# Patient Record
Sex: MALE | Race: WHITE | NOT HISPANIC OR LATINO | Employment: OTHER | URBAN - METROPOLITAN AREA
[De-identification: names, ages, dates, MRNs, and addresses within clinical notes are randomized per-mention and may not be internally consistent; named-entity substitution may affect disease eponyms.]

---

## 2018-05-09 ENCOUNTER — OUTPATIENT INFUSION SERVICES (OUTPATIENT)
Dept: ONCOLOGY | Facility: MEDICAL CENTER | Age: 63
End: 2018-05-09
Attending: ALLERGY & IMMUNOLOGY
Payer: MEDICARE

## 2018-05-09 VITALS
BODY MASS INDEX: 44.51 KG/M2 | SYSTOLIC BLOOD PRESSURE: 147 MMHG | TEMPERATURE: 98.6 F | DIASTOLIC BLOOD PRESSURE: 67 MMHG | OXYGEN SATURATION: 93 % | WEIGHT: 293.65 LBS | HEART RATE: 67 BPM | HEIGHT: 68 IN | RESPIRATION RATE: 18 BRPM

## 2018-05-09 PROCEDURE — 700111 HCHG RX REV CODE 636 W/ 250 OVERRIDE (IP): Mod: TB | Performed by: ALLERGY & IMMUNOLOGY

## 2018-05-09 PROCEDURE — 96401 CHEMO ANTI-NEOPL SQ/IM: CPT

## 2018-05-09 RX ORDER — LEVOTHYROXINE SODIUM 0.15 MG/1
150 TABLET ORAL
COMMUNITY

## 2018-05-09 RX ORDER — CHLORAL HYDRATE 500 MG
1000 CAPSULE ORAL
COMMUNITY

## 2018-05-09 RX ORDER — METOPROLOL SUCCINATE 100 MG/1
100 TABLET, EXTENDED RELEASE ORAL DAILY
COMMUNITY
End: 2020-04-21

## 2018-05-09 RX ORDER — COVID-19 ANTIGEN TEST
KIT MISCELLANEOUS
COMMUNITY
End: 2021-06-30

## 2018-05-09 RX ORDER — BUDESONIDE AND FORMOTEROL FUMARATE DIHYDRATE 160; 4.5 UG/1; UG/1
2 AEROSOL RESPIRATORY (INHALATION) 2 TIMES DAILY
COMMUNITY
End: 2020-03-23

## 2018-05-09 RX ORDER — ATORVASTATIN CALCIUM 10 MG/1
10 TABLET, FILM COATED ORAL NIGHTLY
COMMUNITY
End: 2020-04-21

## 2018-05-09 RX ADMIN — MEPOLIZUMAB 100 MG: 100 INJECTION, POWDER, FOR SOLUTION SUBCUTANEOUS at 08:41

## 2018-05-09 ASSESSMENT — PAIN SCALES - GENERAL: PAINLEVEL_OUTOF10: 0

## 2018-05-09 NOTE — PROGRESS NOTES
Pt arrived ambulatory to IS for first Nucala injection.  Educated patient on new drug and s/sx of reaction.  Pt verbalized understanding of teaching.  Pt denies recent signs of infection.  Nucala administered subq to back of L arm, injection site covered with adhesive bandage.  Pt monitored for 20 minutes after injection given it is his first time.  No adverse reaction noted.  Next appointment confirmed.  Pt discharged from IS in NAD under care of spouse.

## 2018-06-06 ENCOUNTER — OUTPATIENT INFUSION SERVICES (OUTPATIENT)
Dept: ONCOLOGY | Facility: MEDICAL CENTER | Age: 63
End: 2018-06-06
Attending: ALLERGY & IMMUNOLOGY
Payer: MEDICARE

## 2018-06-06 VITALS
RESPIRATION RATE: 18 BRPM | BODY MASS INDEX: 46.06 KG/M2 | WEIGHT: 293.43 LBS | HEART RATE: 66 BPM | SYSTOLIC BLOOD PRESSURE: 148 MMHG | DIASTOLIC BLOOD PRESSURE: 48 MMHG | OXYGEN SATURATION: 92 % | TEMPERATURE: 97.4 F | HEIGHT: 67 IN

## 2018-06-06 PROCEDURE — 96401 CHEMO ANTI-NEOPL SQ/IM: CPT

## 2018-06-06 PROCEDURE — 700111 HCHG RX REV CODE 636 W/ 250 OVERRIDE (IP): Mod: TB | Performed by: ALLERGY & IMMUNOLOGY

## 2018-06-06 RX ADMIN — MEPOLIZUMAB 100 MG: 100 INJECTION, POWDER, FOR SOLUTION SUBCUTANEOUS at 08:45

## 2018-06-06 ASSESSMENT — PAIN SCALES - GENERAL: PAINLEVEL_OUTOF10: 0

## 2018-06-06 NOTE — PROGRESS NOTES
Pt arrived ambulatory to IS for first Nucala injection.  Pt denies recent signs of infection.  Nucala administered subq to back of L arm, injection site covered with adhesive bandage.  Pt tolerated well.  Next appointment confirmed.  Pt discharged from IS in NAD under self care.  e

## 2018-07-04 ENCOUNTER — OUTPATIENT INFUSION SERVICES (OUTPATIENT)
Dept: ONCOLOGY | Facility: MEDICAL CENTER | Age: 63
End: 2018-07-04
Attending: ALLERGY & IMMUNOLOGY
Payer: MEDICARE

## 2018-07-04 VITALS
OXYGEN SATURATION: 92 % | HEART RATE: 67 BPM | HEIGHT: 67 IN | RESPIRATION RATE: 18 BRPM | DIASTOLIC BLOOD PRESSURE: 50 MMHG | BODY MASS INDEX: 46.06 KG/M2 | WEIGHT: 293.43 LBS | SYSTOLIC BLOOD PRESSURE: 147 MMHG | TEMPERATURE: 98 F

## 2018-07-04 PROCEDURE — 700111 HCHG RX REV CODE 636 W/ 250 OVERRIDE (IP): Mod: TB | Performed by: ALLERGY & IMMUNOLOGY

## 2018-07-04 PROCEDURE — 96401 CHEMO ANTI-NEOPL SQ/IM: CPT

## 2018-07-04 RX ADMIN — MEPOLIZUMAB 100 MG: 100 INJECTION, POWDER, FOR SOLUTION SUBCUTANEOUS at 08:27

## 2018-07-04 ASSESSMENT — PAIN SCALES - GENERAL: PAINLEVEL_OUTOF10: 0

## 2018-07-04 NOTE — PROGRESS NOTES
Pt arrived to IS, ambulatory, for Nucala injection. Pt voices no complaints. Nucala injected into the L-upper arm with no s/sx of adverse reaction. Pt left IS with no s/sx of distress. Follow up appointment confirmed.

## 2018-08-01 ENCOUNTER — OUTPATIENT INFUSION SERVICES (OUTPATIENT)
Dept: ONCOLOGY | Facility: MEDICAL CENTER | Age: 63
End: 2018-08-01
Attending: ALLERGY & IMMUNOLOGY
Payer: MEDICARE

## 2018-08-01 VITALS
DIASTOLIC BLOOD PRESSURE: 55 MMHG | RESPIRATION RATE: 18 BRPM | TEMPERATURE: 97.5 F | HEIGHT: 67 IN | SYSTOLIC BLOOD PRESSURE: 167 MMHG | HEART RATE: 75 BPM | WEIGHT: 291.45 LBS | OXYGEN SATURATION: 91 % | BODY MASS INDEX: 45.74 KG/M2

## 2018-08-01 PROCEDURE — 700111 HCHG RX REV CODE 636 W/ 250 OVERRIDE (IP): Mod: TB | Performed by: ALLERGY & IMMUNOLOGY

## 2018-08-01 PROCEDURE — 96401 CHEMO ANTI-NEOPL SQ/IM: CPT

## 2018-08-01 RX ADMIN — MEPOLIZUMAB 100 MG: 100 INJECTION, POWDER, FOR SOLUTION SUBCUTANEOUS at 08:10

## 2018-08-01 ASSESSMENT — PAIN SCALES - GENERAL: PAINLEVEL_OUTOF10: 0

## 2018-08-01 NOTE — PROGRESS NOTES
"Patient arrived for monthly Nucala injection; reports last months \"shot worked really well.\"  Denies any changes or concerns.  Nucala injected to L back arm, bandaid in place. Pt tolerated well.  Next appt confirmed.  Discharged home in no apparent distress.  "

## 2018-09-03 ENCOUNTER — OUTPATIENT INFUSION SERVICES (OUTPATIENT)
Dept: ONCOLOGY | Facility: MEDICAL CENTER | Age: 63
End: 2018-09-03
Attending: ALLERGY & IMMUNOLOGY
Payer: MEDICARE

## 2018-09-03 VITALS
HEIGHT: 68 IN | RESPIRATION RATE: 18 BRPM | HEART RATE: 80 BPM | OXYGEN SATURATION: 92 % | BODY MASS INDEX: 43.3 KG/M2 | DIASTOLIC BLOOD PRESSURE: 55 MMHG | WEIGHT: 285.72 LBS | SYSTOLIC BLOOD PRESSURE: 165 MMHG | TEMPERATURE: 97.4 F

## 2018-09-03 PROCEDURE — 700111 HCHG RX REV CODE 636 W/ 250 OVERRIDE (IP): Mod: TB | Performed by: ALLERGY & IMMUNOLOGY

## 2018-09-03 PROCEDURE — 96401 CHEMO ANTI-NEOPL SQ/IM: CPT

## 2018-09-03 RX ADMIN — MEPOLIZUMAB 100 MG: 100 INJECTION, POWDER, FOR SOLUTION SUBCUTANEOUS at 08:30

## 2018-09-03 ASSESSMENT — PAIN SCALES - GENERAL: PAINLEVEL_OUTOF10: 0

## 2018-10-01 ENCOUNTER — OUTPATIENT INFUSION SERVICES (OUTPATIENT)
Dept: ONCOLOGY | Facility: MEDICAL CENTER | Age: 63
End: 2018-10-01
Attending: ALLERGY & IMMUNOLOGY
Payer: MEDICARE

## 2018-10-01 VITALS
SYSTOLIC BLOOD PRESSURE: 154 MMHG | DIASTOLIC BLOOD PRESSURE: 50 MMHG | HEIGHT: 68 IN | RESPIRATION RATE: 18 BRPM | BODY MASS INDEX: 42.97 KG/M2 | OXYGEN SATURATION: 92 % | TEMPERATURE: 97.5 F | WEIGHT: 283.51 LBS | HEART RATE: 75 BPM

## 2018-10-01 PROCEDURE — 700111 HCHG RX REV CODE 636 W/ 250 OVERRIDE (IP): Mod: TB | Performed by: ALLERGY & IMMUNOLOGY

## 2018-10-01 PROCEDURE — 96401 CHEMO ANTI-NEOPL SQ/IM: CPT

## 2018-10-01 RX ADMIN — MEPOLIZUMAB 100 MG: 100 INJECTION, POWDER, FOR SOLUTION SUBCUTANEOUS at 08:20

## 2018-10-01 ASSESSMENT — PAIN SCALES - GENERAL: PAINLEVEL_OUTOF10: 0

## 2018-10-01 NOTE — PROGRESS NOTES
Pt arrived ambulatory to IS for first Nucala injection.  Pt denies active signs of infection.  Nucala administered subq to back of R arm, injection site covered with adhesive bandage.  Pt tolerated well.  Next appointment confirmed.  Pt discharged from IS in NAD under self care.

## 2018-10-29 ENCOUNTER — OUTPATIENT INFUSION SERVICES (OUTPATIENT)
Dept: ONCOLOGY | Facility: MEDICAL CENTER | Age: 63
End: 2018-10-29
Attending: ALLERGY & IMMUNOLOGY
Payer: MEDICARE

## 2018-10-29 VITALS
BODY MASS INDEX: 44.53 KG/M2 | RESPIRATION RATE: 18 BRPM | DIASTOLIC BLOOD PRESSURE: 54 MMHG | OXYGEN SATURATION: 92 % | HEART RATE: 75 BPM | HEIGHT: 67 IN | SYSTOLIC BLOOD PRESSURE: 157 MMHG | WEIGHT: 283.73 LBS | TEMPERATURE: 97.4 F

## 2018-10-29 PROCEDURE — 96401 CHEMO ANTI-NEOPL SQ/IM: CPT

## 2018-10-29 PROCEDURE — 700111 HCHG RX REV CODE 636 W/ 250 OVERRIDE (IP): Mod: TB | Performed by: ALLERGY & IMMUNOLOGY

## 2018-10-29 RX ORDER — ASPIRIN 81 MG/1
81 TABLET, CHEWABLE ORAL DAILY
COMMUNITY

## 2018-10-29 RX ADMIN — MEPOLIZUMAB 100 MG: 100 INJECTION, POWDER, FOR SOLUTION SUBCUTANEOUS at 08:23

## 2018-10-29 ASSESSMENT — PAIN SCALES - GENERAL: PAINLEVEL_OUTOF10: 0

## 2018-10-29 NOTE — PROGRESS NOTES
Patient presents for Nucala injection. Patient denies any active infections. Nucala given as ordered to back of right arm, per patient request. Patient scheduled for next appointment and released in no acute distress.

## 2018-11-26 ENCOUNTER — OUTPATIENT INFUSION SERVICES (OUTPATIENT)
Dept: ONCOLOGY | Facility: MEDICAL CENTER | Age: 63
End: 2018-11-26
Attending: ALLERGY & IMMUNOLOGY
Payer: MEDICARE

## 2018-11-26 VITALS
RESPIRATION RATE: 18 BRPM | TEMPERATURE: 97.7 F | BODY MASS INDEX: 43.94 KG/M2 | WEIGHT: 279.98 LBS | HEART RATE: 68 BPM | DIASTOLIC BLOOD PRESSURE: 58 MMHG | SYSTOLIC BLOOD PRESSURE: 137 MMHG | HEIGHT: 67 IN | OXYGEN SATURATION: 93 %

## 2018-11-26 PROCEDURE — 96413 CHEMO IV INFUSION 1 HR: CPT

## 2018-11-26 PROCEDURE — 96401 CHEMO ANTI-NEOPL SQ/IM: CPT

## 2018-11-26 PROCEDURE — 700111 HCHG RX REV CODE 636 W/ 250 OVERRIDE (IP): Mod: TB | Performed by: ALLERGY & IMMUNOLOGY

## 2018-11-26 RX ADMIN — MEPOLIZUMAB 100 MG: 100 INJECTION, POWDER, FOR SOLUTION SUBCUTANEOUS at 08:37

## 2018-11-26 ASSESSMENT — PAIN SCALES - GENERAL: PAINLEVEL_OUTOF10: 0

## 2018-12-24 ENCOUNTER — OUTPATIENT INFUSION SERVICES (OUTPATIENT)
Dept: ONCOLOGY | Facility: MEDICAL CENTER | Age: 63
End: 2018-12-24
Attending: ALLERGY & IMMUNOLOGY
Payer: MEDICARE

## 2018-12-24 VITALS
OXYGEN SATURATION: 92 % | SYSTOLIC BLOOD PRESSURE: 121 MMHG | RESPIRATION RATE: 18 BRPM | HEIGHT: 67 IN | WEIGHT: 279.54 LBS | TEMPERATURE: 97.5 F | DIASTOLIC BLOOD PRESSURE: 47 MMHG | HEART RATE: 70 BPM | BODY MASS INDEX: 43.88 KG/M2

## 2018-12-24 PROCEDURE — 700111 HCHG RX REV CODE 636 W/ 250 OVERRIDE (IP): Mod: TB | Performed by: ALLERGY & IMMUNOLOGY

## 2018-12-24 PROCEDURE — 96401 CHEMO ANTI-NEOPL SQ/IM: CPT

## 2018-12-24 RX ADMIN — MEPOLIZUMAB 100 MG: 100 INJECTION, POWDER, FOR SOLUTION SUBCUTANEOUS at 08:33

## 2018-12-24 ASSESSMENT — PAIN SCALES - GENERAL: PAINLEVEL_OUTOF10: 0

## 2018-12-24 NOTE — PROGRESS NOTES
Patient arrived to Infusion for Nucala; reports no changes or concerns.  Pt does state he is going to [place] for the winter and has established care to received Nucala there while on vacation.  Nucala given to L back arm, pt tolerated well.  Bandaid in place.  Pt to schedule next appt upon return. Discharged home in no apparent distress.

## 2019-04-19 ENCOUNTER — OUTPATIENT INFUSION SERVICES (OUTPATIENT)
Dept: ONCOLOGY | Facility: MEDICAL CENTER | Age: 64
End: 2019-04-19
Attending: ALLERGY & IMMUNOLOGY
Payer: MEDICARE

## 2019-04-19 VITALS
SYSTOLIC BLOOD PRESSURE: 128 MMHG | WEIGHT: 281.75 LBS | OXYGEN SATURATION: 91 % | TEMPERATURE: 97.3 F | RESPIRATION RATE: 18 BRPM | HEART RATE: 59 BPM | DIASTOLIC BLOOD PRESSURE: 43 MMHG | BODY MASS INDEX: 44.22 KG/M2 | HEIGHT: 67 IN

## 2019-04-19 PROCEDURE — 700111 HCHG RX REV CODE 636 W/ 250 OVERRIDE (IP): Mod: TB | Performed by: ALLERGY & IMMUNOLOGY

## 2019-04-19 PROCEDURE — 96401 CHEMO ANTI-NEOPL SQ/IM: CPT

## 2019-04-19 RX ADMIN — MEPOLIZUMAB 100 MG: 100 INJECTION, POWDER, FOR SOLUTION SUBCUTANEOUS at 10:00

## 2019-04-19 NOTE — PROGRESS NOTES
Nucala administered per MD orders to left back of arm via subcutaneous injection.  Pt tolerated injection well and without incident.  Band aid applied to injection site.  No adverse effects observed or expressed.  Pt left infusion services in no apparent distress after completion of treatment, ambulatory.  Pt to return in 4 weeks; next appointment scheduled.

## 2019-04-19 NOTE — PROGRESS NOTES
Pt to infusion services ambulatory per self.  Pt here for scheduled Nucala injection.  Plan of care reviewed.  Pt verbalizes understanding.  Pt in good spirits and reports no issues or concerns.  Plan of care reviewed.  Pt verbalizes understanding.  Pt denies any s/sx of infection today.  Chart to pharmacy.

## 2019-05-17 ENCOUNTER — OUTPATIENT INFUSION SERVICES (OUTPATIENT)
Dept: ONCOLOGY | Facility: MEDICAL CENTER | Age: 64
End: 2019-05-17
Attending: ALLERGY & IMMUNOLOGY
Payer: MEDICARE

## 2019-05-17 VITALS
OXYGEN SATURATION: 94 % | SYSTOLIC BLOOD PRESSURE: 165 MMHG | WEIGHT: 280.2 LBS | DIASTOLIC BLOOD PRESSURE: 53 MMHG | BODY MASS INDEX: 43.98 KG/M2 | HEIGHT: 67 IN | HEART RATE: 63 BPM | TEMPERATURE: 98.2 F | RESPIRATION RATE: 18 BRPM

## 2019-05-17 PROCEDURE — 700111 HCHG RX REV CODE 636 W/ 250 OVERRIDE (IP): Mod: TB | Performed by: ALLERGY & IMMUNOLOGY

## 2019-05-17 PROCEDURE — 96372 THER/PROPH/DIAG INJ SC/IM: CPT

## 2019-05-17 RX ADMIN — MEPOLIZUMAB 100 MG: 100 INJECTION, POWDER, FOR SOLUTION SUBCUTANEOUS at 09:18

## 2019-05-17 NOTE — PROGRESS NOTES
Here for Nucala today. Denies any complaints. Nucala injected subcutaneous as ordered. Tolerated well with no reactions. Returns in 4 weeks, discharged in no distress at this time.

## 2019-06-14 ENCOUNTER — OUTPATIENT INFUSION SERVICES (OUTPATIENT)
Dept: ONCOLOGY | Facility: MEDICAL CENTER | Age: 64
End: 2019-06-14
Attending: ALLERGY & IMMUNOLOGY
Payer: MEDICARE

## 2019-06-14 VITALS
HEART RATE: 63 BPM | BODY MASS INDEX: 43.88 KG/M2 | DIASTOLIC BLOOD PRESSURE: 50 MMHG | RESPIRATION RATE: 18 BRPM | OXYGEN SATURATION: 92 % | SYSTOLIC BLOOD PRESSURE: 120 MMHG | HEIGHT: 67 IN | WEIGHT: 279.54 LBS | TEMPERATURE: 97.2 F

## 2019-06-14 PROCEDURE — 96372 THER/PROPH/DIAG INJ SC/IM: CPT

## 2019-06-14 PROCEDURE — 700111 HCHG RX REV CODE 636 W/ 250 OVERRIDE (IP): Mod: TB | Performed by: ALLERGY & IMMUNOLOGY

## 2019-06-14 RX ADMIN — MEPOLIZUMAB 100 MG: 100 INJECTION, POWDER, FOR SOLUTION SUBCUTANEOUS at 08:09

## 2019-07-12 ENCOUNTER — OUTPATIENT INFUSION SERVICES (OUTPATIENT)
Dept: ONCOLOGY | Facility: MEDICAL CENTER | Age: 64
End: 2019-07-12
Attending: ALLERGY & IMMUNOLOGY
Payer: MEDICARE

## 2019-07-12 VITALS
RESPIRATION RATE: 18 BRPM | OXYGEN SATURATION: 92 % | WEIGHT: 282.19 LBS | BODY MASS INDEX: 45.35 KG/M2 | HEART RATE: 67 BPM | TEMPERATURE: 97.2 F | DIASTOLIC BLOOD PRESSURE: 61 MMHG | SYSTOLIC BLOOD PRESSURE: 151 MMHG | HEIGHT: 66 IN

## 2019-07-12 PROCEDURE — 96372 THER/PROPH/DIAG INJ SC/IM: CPT

## 2019-07-12 PROCEDURE — 700111 HCHG RX REV CODE 636 W/ 250 OVERRIDE (IP): Mod: TB | Performed by: ALLERGY & IMMUNOLOGY

## 2019-07-12 RX ADMIN — MEPOLIZUMAB 100 MG: 100 INJECTION, POWDER, FOR SOLUTION SUBCUTANEOUS at 08:27

## 2019-07-12 NOTE — PROGRESS NOTES
Pt arrived ambulatory to \[facility]\"" for Nucala injection. POC discussed with pt and he agrees with POC. Pt medicated per MAR. Pt tolerated injection without s/s adverse reaction. Pt discharged to self care. Additional appts made for pt. Pt given hand written list of appts. Pt verbalized understanding.

## 2019-08-09 ENCOUNTER — OUTPATIENT INFUSION SERVICES (OUTPATIENT)
Dept: ONCOLOGY | Facility: MEDICAL CENTER | Age: 64
End: 2019-08-09
Attending: ALLERGY & IMMUNOLOGY
Payer: MEDICARE

## 2019-08-09 VITALS
DIASTOLIC BLOOD PRESSURE: 42 MMHG | SYSTOLIC BLOOD PRESSURE: 115 MMHG | RESPIRATION RATE: 18 BRPM | BODY MASS INDEX: 44.12 KG/M2 | OXYGEN SATURATION: 92 % | HEIGHT: 67 IN | WEIGHT: 281.09 LBS | HEART RATE: 62 BPM | TEMPERATURE: 97.6 F

## 2019-08-09 PROCEDURE — 96372 THER/PROPH/DIAG INJ SC/IM: CPT

## 2019-08-09 PROCEDURE — 700111 HCHG RX REV CODE 636 W/ 250 OVERRIDE (IP): Mod: JG | Performed by: ALLERGY & IMMUNOLOGY

## 2019-08-09 RX ADMIN — MEPOLIZUMAB 100 MG: 100 INJECTION, POWDER, FOR SOLUTION SUBCUTANEOUS at 08:18

## 2019-08-09 NOTE — PROGRESS NOTES
Pt arrived ambulatory to [facility] for [facility] injection. POC discussed with pt and he agrees with POC. Pt medicated per MAR. Pt tolerated injection without s/s adverse reaction. Pt discharged to self care. Pt reminded of next appt 9/6/19. Pt verbalized understanding.

## 2019-09-06 ENCOUNTER — OUTPATIENT INFUSION SERVICES (OUTPATIENT)
Dept: ONCOLOGY | Facility: MEDICAL CENTER | Age: 64
End: 2019-09-06
Attending: ALLERGY & IMMUNOLOGY
Payer: MEDICARE

## 2019-09-06 VITALS
HEIGHT: 67 IN | RESPIRATION RATE: 18 BRPM | BODY MASS INDEX: 43.84 KG/M2 | WEIGHT: 279.32 LBS | DIASTOLIC BLOOD PRESSURE: 64 MMHG | OXYGEN SATURATION: 93 % | TEMPERATURE: 97.7 F | SYSTOLIC BLOOD PRESSURE: 135 MMHG | HEART RATE: 62 BPM

## 2019-09-06 PROCEDURE — 700111 HCHG RX REV CODE 636 W/ 250 OVERRIDE (IP): Mod: JG | Performed by: ALLERGY & IMMUNOLOGY

## 2019-09-06 PROCEDURE — 96372 THER/PROPH/DIAG INJ SC/IM: CPT

## 2019-09-06 RX ADMIN — MEPOLIZUMAB 100 MG: 100 INJECTION, POWDER, FOR SOLUTION SUBCUTANEOUS at 08:32

## 2019-09-06 NOTE — PROGRESS NOTES
Pt arrived ambulatory to hospitals for Nucala injection. Pt medicated per MAR. Pt tolerated injection without s/s adverse reaction. Pt discharged to self care. Pt reminded of next appt 10/4/19. Pt verbalized understanding.

## 2019-10-04 ENCOUNTER — OUTPATIENT INFUSION SERVICES (OUTPATIENT)
Dept: ONCOLOGY | Facility: MEDICAL CENTER | Age: 64
End: 2019-10-04
Attending: ALLERGY & IMMUNOLOGY
Payer: MEDICARE

## 2019-10-04 VITALS
RESPIRATION RATE: 18 BRPM | OXYGEN SATURATION: 93 % | SYSTOLIC BLOOD PRESSURE: 133 MMHG | DIASTOLIC BLOOD PRESSURE: 42 MMHG | TEMPERATURE: 98 F | WEIGHT: 278 LBS | HEART RATE: 70 BPM | HEIGHT: 67 IN | BODY MASS INDEX: 43.63 KG/M2

## 2019-10-04 PROCEDURE — 96372 THER/PROPH/DIAG INJ SC/IM: CPT

## 2019-10-04 PROCEDURE — 700111 HCHG RX REV CODE 636 W/ 250 OVERRIDE (IP): Mod: JG | Performed by: ALLERGY & IMMUNOLOGY

## 2019-10-04 RX ADMIN — MEPOLIZUMAB 100 MG: 100 INJECTION, POWDER, FOR SOLUTION SUBCUTANEOUS at 08:39

## 2019-10-04 NOTE — PROGRESS NOTES
Pt arrived ambulatory to [facility] for Nucala injection. Pt states he is feeling well. Pt medicated per MAR. Pt tolerated injection without s/s adverse reaction. November and December appts made for pt. Pt aware of next appts. Pt discharged to self care, [facility].

## 2019-11-15 ENCOUNTER — OUTPATIENT INFUSION SERVICES (OUTPATIENT)
Dept: ONCOLOGY | Facility: MEDICAL CENTER | Age: 64
End: 2019-11-15
Attending: ALLERGY & IMMUNOLOGY
Payer: MEDICARE

## 2019-11-15 VITALS
DIASTOLIC BLOOD PRESSURE: 50 MMHG | WEIGHT: 276.68 LBS | BODY MASS INDEX: 43.43 KG/M2 | RESPIRATION RATE: 18 BRPM | HEIGHT: 67 IN | TEMPERATURE: 97.7 F | HEART RATE: 63 BPM | OXYGEN SATURATION: 94 % | SYSTOLIC BLOOD PRESSURE: 118 MMHG

## 2019-11-15 PROCEDURE — 700111 HCHG RX REV CODE 636 W/ 250 OVERRIDE (IP): Mod: JG | Performed by: ALLERGY & IMMUNOLOGY

## 2019-11-15 PROCEDURE — 96372 THER/PROPH/DIAG INJ SC/IM: CPT

## 2019-11-15 RX ADMIN — MEPOLIZUMAB 100 MG: 100 INJECTION, POWDER, FOR SOLUTION SUBCUTANEOUS at 08:14

## 2019-11-15 NOTE — PROGRESS NOTES
Pt ambulatory to [facility] for Nucala injection for asthma.  Pt w/ no s/s of infection, pt w/ no complaints at this time.  Pt states his asthma improving w/ injection.  Nucala injected into right back arm, band-aid placed.  Pt left on foot in NAD.  Confirmed pt's next appt.

## 2019-12-23 ENCOUNTER — OUTPATIENT INFUSION SERVICES (OUTPATIENT)
Dept: ONCOLOGY | Facility: MEDICAL CENTER | Age: 64
End: 2019-12-23
Attending: ALLERGY & IMMUNOLOGY
Payer: MEDICARE

## 2019-12-23 VITALS
WEIGHT: 280.87 LBS | DIASTOLIC BLOOD PRESSURE: 48 MMHG | TEMPERATURE: 97.9 F | OXYGEN SATURATION: 92 % | SYSTOLIC BLOOD PRESSURE: 153 MMHG | HEART RATE: 72 BPM | HEIGHT: 67 IN | BODY MASS INDEX: 44.08 KG/M2 | RESPIRATION RATE: 18 BRPM

## 2019-12-23 PROCEDURE — 700111 HCHG RX REV CODE 636 W/ 250 OVERRIDE (IP): Mod: JG | Performed by: ALLERGY & IMMUNOLOGY

## 2019-12-23 PROCEDURE — 96372 THER/PROPH/DIAG INJ SC/IM: CPT

## 2019-12-23 RX ADMIN — MEPOLIZUMAB 100 MG: 100 INJECTION, POWDER, FOR SOLUTION SUBCUTANEOUS at 08:55

## 2019-12-23 NOTE — PROGRESS NOTES
Pt arrived ambulatory to [facility] for Nucala injection. POC discussed with pt and he agrees with plan. Pt states he is feeling well. Pt medicated per MAR. Pt tolerated injection without s/s adverse reaction. Pt discharged to self care, [facility]. Pt states he will get his next injection in [place]. Pt given scheduling contact number to make future appts when he is back in [place].

## 2020-03-23 ENCOUNTER — OUTPATIENT INFUSION SERVICES (OUTPATIENT)
Dept: ONCOLOGY | Facility: MEDICAL CENTER | Age: 65
End: 2020-03-23
Attending: ALLERGY & IMMUNOLOGY
Payer: MEDICARE

## 2020-03-23 VITALS
SYSTOLIC BLOOD PRESSURE: 131 MMHG | TEMPERATURE: 97.9 F | HEIGHT: 67 IN | HEART RATE: 58 BPM | RESPIRATION RATE: 18 BRPM | WEIGHT: 278.66 LBS | OXYGEN SATURATION: 93 % | DIASTOLIC BLOOD PRESSURE: 58 MMHG | BODY MASS INDEX: 43.74 KG/M2

## 2020-03-23 PROCEDURE — 700111 HCHG RX REV CODE 636 W/ 250 OVERRIDE (IP): Mod: JG | Performed by: ALLERGY & IMMUNOLOGY

## 2020-03-23 PROCEDURE — 96372 THER/PROPH/DIAG INJ SC/IM: CPT

## 2020-03-23 RX ORDER — INSULIN GLARGINE 300 U/ML
INJECTION, SOLUTION SUBCUTANEOUS
COMMUNITY
Start: 2020-02-07 | End: 2021-09-21

## 2020-03-23 RX ADMIN — MEPOLIZUMAB 100 MG: 100 INJECTION, POWDER, FOR SOLUTION SUBCUTANEOUS at 08:42

## 2020-03-23 NOTE — PROGRESS NOTES
Pt arrives to [facility] for monthly Nucala injection.  Pt has been out of state for a few months.  Pt denies signs of infections or worsening symptoms.  Nucala given SC to back of LUE.  Band-Aid placed over site.  Scheduled pt for next appt and informed pt of date/time.  Pt dc home in stable condition.

## 2020-04-20 ENCOUNTER — TELEPHONE (OUTPATIENT)
Dept: ONCOLOGY | Facility: MEDICAL CENTER | Age: 65
End: 2020-04-20

## 2020-04-21 ENCOUNTER — OUTPATIENT INFUSION SERVICES (OUTPATIENT)
Dept: ONCOLOGY | Facility: MEDICAL CENTER | Age: 65
End: 2020-04-21
Attending: ALLERGY & IMMUNOLOGY
Payer: MEDICARE

## 2020-04-21 VITALS
TEMPERATURE: 97.3 F | HEART RATE: 65 BPM | OXYGEN SATURATION: 91 % | BODY MASS INDEX: 43.81 KG/M2 | RESPIRATION RATE: 18 BRPM | SYSTOLIC BLOOD PRESSURE: 149 MMHG | WEIGHT: 279.1 LBS | HEIGHT: 67 IN | DIASTOLIC BLOOD PRESSURE: 45 MMHG

## 2020-04-21 PROCEDURE — 700111 HCHG RX REV CODE 636 W/ 250 OVERRIDE (IP): Mod: JG | Performed by: ALLERGY & IMMUNOLOGY

## 2020-04-21 PROCEDURE — 96372 THER/PROPH/DIAG INJ SC/IM: CPT

## 2020-04-21 RX ORDER — LOSARTAN POTASSIUM 100 MG/1
TABLET ORAL DAILY
COMMUNITY
Start: 2020-04-10

## 2020-04-21 RX ORDER — ATORVASTATIN CALCIUM 40 MG/1
TABLET, FILM COATED ORAL
COMMUNITY
Start: 2020-02-04 | End: 2023-01-13 | Stop reason: CLARIF

## 2020-04-21 RX ORDER — OMEPRAZOLE 40 MG/1
CAPSULE, DELAYED RELEASE ORAL DAILY
COMMUNITY
Start: 2020-03-19

## 2020-04-21 RX ORDER — METFORMIN HYDROCHLORIDE 500 MG/1
TABLET, EXTENDED RELEASE ORAL
COMMUNITY
Start: 2020-03-27

## 2020-04-21 RX ORDER — FLURBIPROFEN SODIUM 0.3 MG/ML
SOLUTION/ DROPS OPHTHALMIC
COMMUNITY
Start: 2020-01-23

## 2020-04-21 RX ADMIN — MEPOLIZUMAB 100 MG: 100 INJECTION, POWDER, FOR SOLUTION SUBCUTANEOUS at 08:56

## 2020-04-21 NOTE — PROGRESS NOTES
Ted into [facility]ala. Pt denied having any new or acute complaints today, reports tolerating past treatments well. Pt given nucala as prescribed in back right arm, tolerated well, denied having any complaints during or after injection. Bandaid applied to injection site. Next appointment scheduled, discharged home to self care.

## 2020-05-19 ENCOUNTER — OUTPATIENT INFUSION SERVICES (OUTPATIENT)
Dept: ONCOLOGY | Facility: MEDICAL CENTER | Age: 65
End: 2020-05-19
Attending: ALLERGY & IMMUNOLOGY
Payer: MEDICARE

## 2020-05-19 VITALS
SYSTOLIC BLOOD PRESSURE: 125 MMHG | RESPIRATION RATE: 18 BRPM | OXYGEN SATURATION: 92 % | BODY MASS INDEX: 43.25 KG/M2 | WEIGHT: 275.57 LBS | DIASTOLIC BLOOD PRESSURE: 44 MMHG | HEART RATE: 66 BPM | HEIGHT: 67 IN | TEMPERATURE: 98.1 F

## 2020-05-19 PROCEDURE — 96372 THER/PROPH/DIAG INJ SC/IM: CPT

## 2020-05-19 PROCEDURE — 700111 HCHG RX REV CODE 636 W/ 250 OVERRIDE (IP): Mod: JG | Performed by: ALLERGY & IMMUNOLOGY

## 2020-05-19 RX ADMIN — MEPOLIZUMAB 100 MG: 100 INJECTION, POWDER, FOR SOLUTION SUBCUTANEOUS at 08:54

## 2020-05-19 NOTE — PROGRESS NOTES
Ted into [facility] for nucala. Pt denied having any new or acute complaints today, reports tolerating past treatments well. Pt given nucala as prescribed in back left arm, tolerated well, denied having any complaints during or after injection. Bandaid applied to injection site. Next appointment scheduled, discharged home to self care.

## 2020-06-16 ENCOUNTER — OUTPATIENT INFUSION SERVICES (OUTPATIENT)
Dept: ONCOLOGY | Facility: MEDICAL CENTER | Age: 65
End: 2020-06-16
Attending: ALLERGY & IMMUNOLOGY
Payer: MEDICARE

## 2020-06-16 VITALS
OXYGEN SATURATION: 97 % | BODY MASS INDEX: 42.56 KG/M2 | DIASTOLIC BLOOD PRESSURE: 52 MMHG | SYSTOLIC BLOOD PRESSURE: 155 MMHG | RESPIRATION RATE: 18 BRPM | HEIGHT: 67 IN | HEART RATE: 77 BPM | WEIGHT: 271.17 LBS | TEMPERATURE: 97.8 F

## 2020-06-16 PROCEDURE — 96372 THER/PROPH/DIAG INJ SC/IM: CPT | Performed by: CLINICAL NURSE SPECIALIST

## 2020-06-16 PROCEDURE — 700111 HCHG RX REV CODE 636 W/ 250 OVERRIDE (IP): Mod: JG | Performed by: ALLERGY & IMMUNOLOGY

## 2020-06-16 RX ADMIN — MEPOLIZUMAB 100 MG: 100 INJECTION, POWDER, FOR SOLUTION SUBCUTANEOUS at 09:49

## 2020-06-16 NOTE — PROGRESS NOTES
Patient to infusion for nucala injection.  No new concerns.  No recent asthma exacerbations.  Nucala injected into right back arm without issue. Next appointment scheduled and printout provided to patient.  Discharged ambulatory to home in stable condition.

## 2020-07-13 ENCOUNTER — TELEPHONE (OUTPATIENT)
Dept: ONCOLOGY | Facility: MEDICAL CENTER | Age: 65
End: 2020-07-13

## 2020-07-14 ENCOUNTER — OUTPATIENT INFUSION SERVICES (OUTPATIENT)
Dept: ONCOLOGY | Facility: MEDICAL CENTER | Age: 65
End: 2020-07-14
Attending: ALLERGY & IMMUNOLOGY
Payer: MEDICARE

## 2020-07-14 VITALS
HEIGHT: 67 IN | BODY MASS INDEX: 43.25 KG/M2 | RESPIRATION RATE: 18 BRPM | DIASTOLIC BLOOD PRESSURE: 59 MMHG | TEMPERATURE: 97.9 F | HEART RATE: 65 BPM | OXYGEN SATURATION: 92 % | SYSTOLIC BLOOD PRESSURE: 144 MMHG | WEIGHT: 275.57 LBS

## 2020-07-14 PROCEDURE — 96372 THER/PROPH/DIAG INJ SC/IM: CPT

## 2020-07-14 PROCEDURE — 700111 HCHG RX REV CODE 636 W/ 250 OVERRIDE (IP): Mod: JG | Performed by: ALLERGY & IMMUNOLOGY

## 2020-07-14 RX ADMIN — MEPOLIZUMAB 100 MG: 100 INJECTION, POWDER, FOR SOLUTION SUBCUTANEOUS at 08:37

## 2020-08-11 ENCOUNTER — OUTPATIENT INFUSION SERVICES (OUTPATIENT)
Dept: ONCOLOGY | Facility: MEDICAL CENTER | Age: 65
End: 2020-08-11
Attending: ALLERGY & IMMUNOLOGY
Payer: MEDICARE

## 2020-08-11 VITALS
HEART RATE: 69 BPM | RESPIRATION RATE: 18 BRPM | HEIGHT: 67 IN | BODY MASS INDEX: 43.11 KG/M2 | DIASTOLIC BLOOD PRESSURE: 55 MMHG | TEMPERATURE: 97.4 F | OXYGEN SATURATION: 96 % | SYSTOLIC BLOOD PRESSURE: 139 MMHG | WEIGHT: 274.69 LBS

## 2020-08-11 DIAGNOSIS — J45.50 SEVERE PERSISTENT ASTHMA WITHOUT COMPLICATION: ICD-10-CM

## 2020-08-11 PROCEDURE — 700111 HCHG RX REV CODE 636 W/ 250 OVERRIDE (IP): Mod: JG | Performed by: ALLERGY & IMMUNOLOGY

## 2020-08-11 PROCEDURE — 96372 THER/PROPH/DIAG INJ SC/IM: CPT

## 2020-08-11 RX ADMIN — MEPOLIZUMAB 100 MG: 100 INJECTION, POWDER, FOR SOLUTION SUBCUTANEOUS at 08:46

## 2020-08-11 NOTE — PROGRESS NOTES
Pt arrives to [facility] for monthly Nucala injection.  Pt denies s/sx of infection or worsening asthma symptoms.  Nucala given SC to back of LUE.  Spoke to scheduling dept to set up future appts.  Copy of schedule given to pt.

## 2020-09-08 ENCOUNTER — OUTPATIENT INFUSION SERVICES (OUTPATIENT)
Dept: ONCOLOGY | Facility: MEDICAL CENTER | Age: 65
End: 2020-09-08
Attending: ALLERGY & IMMUNOLOGY
Payer: MEDICARE

## 2020-09-08 VITALS
HEART RATE: 65 BPM | HEIGHT: 67 IN | TEMPERATURE: 97 F | DIASTOLIC BLOOD PRESSURE: 50 MMHG | WEIGHT: 274.91 LBS | BODY MASS INDEX: 43.15 KG/M2 | SYSTOLIC BLOOD PRESSURE: 150 MMHG | OXYGEN SATURATION: 94 % | RESPIRATION RATE: 18 BRPM

## 2020-09-08 DIAGNOSIS — J45.50 SEVERE PERSISTENT ASTHMA WITHOUT COMPLICATION: ICD-10-CM

## 2020-09-08 PROCEDURE — 700111 HCHG RX REV CODE 636 W/ 250 OVERRIDE (IP): Mod: JG | Performed by: ALLERGY & IMMUNOLOGY

## 2020-09-08 PROCEDURE — 96372 THER/PROPH/DIAG INJ SC/IM: CPT

## 2020-09-08 RX ADMIN — MEPOLIZUMAB 100 MG: 100 INJECTION, POWDER, FOR SOLUTION SUBCUTANEOUS at 08:21

## 2020-09-08 NOTE — PROGRESS NOTES
Patient arrived ambulatory to [facility] for q 4 week Nucala.  He denies any s/s of infection or fevers.  Nucala given to left back arm, pt tolerated well.  Confirmed next appointment and he ambulated out of clinic in no apparent distress.

## 2020-10-06 ENCOUNTER — OUTPATIENT INFUSION SERVICES (OUTPATIENT)
Dept: ONCOLOGY | Facility: MEDICAL CENTER | Age: 65
End: 2020-10-06
Attending: ALLERGY & IMMUNOLOGY
Payer: MEDICARE

## 2020-10-06 VITALS
HEART RATE: 64 BPM | TEMPERATURE: 97.2 F | RESPIRATION RATE: 18 BRPM | BODY MASS INDEX: 43.22 KG/M2 | HEIGHT: 67 IN | OXYGEN SATURATION: 94 % | DIASTOLIC BLOOD PRESSURE: 52 MMHG | WEIGHT: 275.35 LBS | SYSTOLIC BLOOD PRESSURE: 150 MMHG

## 2020-10-06 DIAGNOSIS — J45.50 SEVERE PERSISTENT ASTHMA WITHOUT COMPLICATION: ICD-10-CM

## 2020-10-06 PROCEDURE — 96372 THER/PROPH/DIAG INJ SC/IM: CPT

## 2020-10-06 PROCEDURE — 700111 HCHG RX REV CODE 636 W/ 250 OVERRIDE (IP): Mod: JG | Performed by: ALLERGY & IMMUNOLOGY

## 2020-10-06 RX ADMIN — MEPOLIZUMAB 100 MG: 100 INJECTION, POWDER, FOR SOLUTION SUBCUTANEOUS at 08:28

## 2020-10-06 NOTE — PROGRESS NOTES
Pt to infusion services ambulatory per self.  Pt here for scheduled Nucala injection.  Plan of care reviewed.  Pt verbalizes understanding.  Pt reports no issues or concerns.  Pt denies any s/sx of infection today.  Assessment complete.  VSS.  Chart to pharmacy.  Awaiting medication.

## 2020-10-06 NOTE — PROGRESS NOTES
Late entry for 0830:  Nucala administered per MD orders via subcutaneous injection to R back of arm.  Pt tolerated well.  Pt declines band aid to injection site today, no bleeding observed.  Pt left infusion services in no apparent distress after completion of treatment, ambulatory.  Next appointment scheduled.

## 2020-11-03 ENCOUNTER — OUTPATIENT INFUSION SERVICES (OUTPATIENT)
Dept: ONCOLOGY | Facility: MEDICAL CENTER | Age: 65
End: 2020-11-03
Attending: ALLERGY & IMMUNOLOGY
Payer: MEDICARE

## 2020-11-03 VITALS
BODY MASS INDEX: 43.25 KG/M2 | HEART RATE: 56 BPM | HEIGHT: 67 IN | RESPIRATION RATE: 18 BRPM | SYSTOLIC BLOOD PRESSURE: 158 MMHG | DIASTOLIC BLOOD PRESSURE: 63 MMHG | TEMPERATURE: 98 F | WEIGHT: 275.57 LBS | OXYGEN SATURATION: 96 %

## 2020-11-03 DIAGNOSIS — J45.50 SEVERE PERSISTENT ASTHMA WITHOUT COMPLICATION: ICD-10-CM

## 2020-11-03 PROCEDURE — 700111 HCHG RX REV CODE 636 W/ 250 OVERRIDE (IP): Mod: JG | Performed by: ALLERGY & IMMUNOLOGY

## 2020-11-03 PROCEDURE — 96372 THER/PROPH/DIAG INJ SC/IM: CPT

## 2020-11-03 RX ADMIN — MEPOLIZUMAB 100 MG: 100 INJECTION, POWDER, FOR SOLUTION SUBCUTANEOUS at 08:55

## 2020-11-03 NOTE — PROGRESS NOTES
Pt arrived ambulatory to IS for monthly Nucala injection for Asthma.  POC discussed.  Nucala given as ordered to back of L arm, site covered with adhesive bandage.  Pt tolerated well.  Next appointment confirmed.  Pt discharged from IS in NAD under self care.

## 2020-12-01 ENCOUNTER — OUTPATIENT INFUSION SERVICES (OUTPATIENT)
Dept: ONCOLOGY | Facility: MEDICAL CENTER | Age: 65
End: 2020-12-01
Attending: ALLERGY & IMMUNOLOGY
Payer: MEDICARE

## 2020-12-01 VITALS
TEMPERATURE: 97.6 F | WEIGHT: 275.57 LBS | RESPIRATION RATE: 18 BRPM | HEIGHT: 67 IN | HEART RATE: 63 BPM | DIASTOLIC BLOOD PRESSURE: 55 MMHG | BODY MASS INDEX: 43.25 KG/M2 | OXYGEN SATURATION: 98 % | SYSTOLIC BLOOD PRESSURE: 130 MMHG

## 2020-12-01 DIAGNOSIS — J45.50 SEVERE PERSISTENT ASTHMA WITHOUT COMPLICATION: ICD-10-CM

## 2020-12-01 PROCEDURE — 96372 THER/PROPH/DIAG INJ SC/IM: CPT

## 2020-12-01 PROCEDURE — 700111 HCHG RX REV CODE 636 W/ 250 OVERRIDE (IP): Mod: JG | Performed by: ALLERGY & IMMUNOLOGY

## 2020-12-01 RX ADMIN — MEPOLIZUMAB 100 MG: 100 INJECTION, POWDER, FOR SOLUTION SUBCUTANEOUS at 08:24

## 2020-12-01 NOTE — PROGRESS NOTES
Pt to infusion services ambulatory per self.  Pt here for scheduled Nucala injection.  Plan of care reviewed.  Pt verbalizes understanding.  Pt reports no issues or concerns.  Assessment complete.  VSS.  Chart to pharmacy.  Nucala administered per MD orders to L back of arm via subcutaneous injection.  Pt tolerated injection well and without incident.  Band aid applied to injection site.  Pt left infusion services in no apparent distress after completion of treatment, ambulatory.  Next appointment scheduled.

## 2020-12-28 ENCOUNTER — TELEPHONE (OUTPATIENT)
Dept: ONCOLOGY | Facility: MEDICAL CENTER | Age: 65
End: 2020-12-28

## 2020-12-29 ENCOUNTER — OUTPATIENT INFUSION SERVICES (OUTPATIENT)
Dept: ONCOLOGY | Facility: MEDICAL CENTER | Age: 65
End: 2020-12-29
Attending: ALLERGY & IMMUNOLOGY
Payer: MEDICARE

## 2020-12-29 VITALS
DIASTOLIC BLOOD PRESSURE: 54 MMHG | HEIGHT: 67 IN | OXYGEN SATURATION: 91 % | RESPIRATION RATE: 18 BRPM | SYSTOLIC BLOOD PRESSURE: 166 MMHG | HEART RATE: 88 BPM | TEMPERATURE: 97.8 F | BODY MASS INDEX: 44.08 KG/M2 | WEIGHT: 280.87 LBS

## 2020-12-29 DIAGNOSIS — J45.50 SEVERE PERSISTENT ASTHMA WITHOUT COMPLICATION: ICD-10-CM

## 2020-12-29 PROCEDURE — 700111 HCHG RX REV CODE 636 W/ 250 OVERRIDE (IP): Mod: JG | Performed by: ALLERGY & IMMUNOLOGY

## 2020-12-29 PROCEDURE — 96372 THER/PROPH/DIAG INJ SC/IM: CPT

## 2020-12-29 RX ADMIN — MEPOLIZUMAB 100 MG: 100 INJECTION, POWDER, FOR SOLUTION SUBCUTANEOUS at 08:23

## 2020-12-29 NOTE — PROGRESS NOTES
Patient to [facility] for Nucala injection. Nucala injected into left back of arm. Patient has future appointment. Patient to home in care of self.

## 2021-01-25 ENCOUNTER — TELEPHONE (OUTPATIENT)
Dept: ONCOLOGY | Facility: MEDICAL CENTER | Age: 66
End: 2021-01-25

## 2021-01-26 ENCOUNTER — OUTPATIENT INFUSION SERVICES (OUTPATIENT)
Dept: ONCOLOGY | Facility: MEDICAL CENTER | Age: 66
End: 2021-01-26
Attending: ALLERGY & IMMUNOLOGY
Payer: MEDICARE

## 2021-01-26 VITALS
TEMPERATURE: 97.8 F | RESPIRATION RATE: 18 BRPM | SYSTOLIC BLOOD PRESSURE: 145 MMHG | DIASTOLIC BLOOD PRESSURE: 54 MMHG | HEART RATE: 75 BPM | WEIGHT: 280.2 LBS | HEIGHT: 66 IN | BODY MASS INDEX: 45.03 KG/M2 | OXYGEN SATURATION: 93 %

## 2021-01-26 DIAGNOSIS — J45.50 SEVERE PERSISTENT ASTHMA WITHOUT COMPLICATION: ICD-10-CM

## 2021-01-26 PROCEDURE — 700111 HCHG RX REV CODE 636 W/ 250 OVERRIDE (IP): Mod: JG | Performed by: ALLERGY & IMMUNOLOGY

## 2021-01-26 PROCEDURE — 96372 THER/PROPH/DIAG INJ SC/IM: CPT

## 2021-01-26 RX ADMIN — MEPOLIZUMAB 100 MG: 100 INJECTION, POWDER, FOR SOLUTION SUBCUTANEOUS at 08:20

## 2021-01-26 NOTE — PROGRESS NOTES
Pt arrived ambulatory to IS for monthly Nucala injection.  POC discussed, pt denies active infections today.  Nucala given as ordered to back of L arm, site covered with adhesive bandage.  Pt tolerated well.  Next appointment confirmed.  Pt discharged from IS in NAD under self care.

## 2021-02-23 ENCOUNTER — OUTPATIENT INFUSION SERVICES (OUTPATIENT)
Dept: ONCOLOGY | Facility: MEDICAL CENTER | Age: 66
End: 2021-02-23
Attending: ALLERGY & IMMUNOLOGY
Payer: MEDICARE

## 2021-02-23 VITALS
WEIGHT: 279.54 LBS | SYSTOLIC BLOOD PRESSURE: 142 MMHG | HEART RATE: 66 BPM | BODY MASS INDEX: 44.93 KG/M2 | TEMPERATURE: 97 F | RESPIRATION RATE: 18 BRPM | OXYGEN SATURATION: 94 % | HEIGHT: 66 IN | DIASTOLIC BLOOD PRESSURE: 52 MMHG

## 2021-02-23 DIAGNOSIS — J45.50 SEVERE PERSISTENT ASTHMA WITHOUT COMPLICATION: ICD-10-CM

## 2021-02-23 PROCEDURE — 700111 HCHG RX REV CODE 636 W/ 250 OVERRIDE (IP): Mod: JG | Performed by: ALLERGY & IMMUNOLOGY

## 2021-02-23 PROCEDURE — 96372 THER/PROPH/DIAG INJ SC/IM: CPT

## 2021-02-23 RX ADMIN — MEPOLIZUMAB 100 MG: 100 INJECTION, POWDER, FOR SOLUTION SUBCUTANEOUS at 08:22

## 2021-03-23 ENCOUNTER — APPOINTMENT (OUTPATIENT)
Dept: ONCOLOGY | Facility: MEDICAL CENTER | Age: 66
End: 2021-03-23
Attending: ALLERGY & IMMUNOLOGY
Payer: MEDICARE

## 2021-04-06 ENCOUNTER — OUTPATIENT INFUSION SERVICES (OUTPATIENT)
Dept: ONCOLOGY | Facility: MEDICAL CENTER | Age: 66
End: 2021-04-06
Attending: ALLERGY & IMMUNOLOGY
Payer: MEDICARE

## 2021-04-06 VITALS
TEMPERATURE: 97.6 F | DIASTOLIC BLOOD PRESSURE: 60 MMHG | OXYGEN SATURATION: 98 % | SYSTOLIC BLOOD PRESSURE: 160 MMHG | HEART RATE: 60 BPM | RESPIRATION RATE: 18 BRPM | HEIGHT: 66 IN | BODY MASS INDEX: 44.29 KG/M2 | WEIGHT: 275.57 LBS

## 2021-04-06 DIAGNOSIS — J45.50 SEVERE PERSISTENT ASTHMA WITHOUT COMPLICATION: ICD-10-CM

## 2021-04-06 PROCEDURE — 96372 THER/PROPH/DIAG INJ SC/IM: CPT

## 2021-04-06 PROCEDURE — 700111 HCHG RX REV CODE 636 W/ 250 OVERRIDE (IP): Mod: JG | Performed by: ALLERGY & IMMUNOLOGY

## 2021-04-06 RX ORDER — INSULIN LISPRO 100 [IU]/ML
INJECTION, SOLUTION INTRAVENOUS; SUBCUTANEOUS
COMMUNITY
Start: 2021-03-12 | End: 2021-09-21

## 2021-04-06 RX ADMIN — MEPOLIZUMAB 100 MG: 100 INJECTION, POWDER, FOR SOLUTION SUBCUTANEOUS at 09:01

## 2021-04-06 NOTE — PROGRESS NOTES
Pt ambulatory to [facility] for Nucala injection for asthma.  Pt w/ no s/s of infection, pt w/ no complaints at this time.  Pt states his asthma improving w/ injection.  Nucala injected into right back arm, no band-aid placed.  Pt left on foot in NAD.  Confirmed pt's next appt.

## 2021-05-04 ENCOUNTER — OUTPATIENT INFUSION SERVICES (OUTPATIENT)
Dept: ONCOLOGY | Facility: MEDICAL CENTER | Age: 66
End: 2021-05-04
Attending: ALLERGY & IMMUNOLOGY
Payer: MEDICARE

## 2021-05-04 VITALS
RESPIRATION RATE: 18 BRPM | HEIGHT: 67 IN | TEMPERATURE: 97 F | SYSTOLIC BLOOD PRESSURE: 146 MMHG | DIASTOLIC BLOOD PRESSURE: 62 MMHG | WEIGHT: 278.66 LBS | BODY MASS INDEX: 43.74 KG/M2 | OXYGEN SATURATION: 94 % | HEART RATE: 65 BPM

## 2021-05-04 DIAGNOSIS — J45.50 SEVERE PERSISTENT ASTHMA WITHOUT COMPLICATION: ICD-10-CM

## 2021-05-04 PROCEDURE — 700111 HCHG RX REV CODE 636 W/ 250 OVERRIDE (IP): Mod: JG | Performed by: ALLERGY & IMMUNOLOGY

## 2021-05-04 PROCEDURE — 96372 THER/PROPH/DIAG INJ SC/IM: CPT

## 2021-05-04 RX ORDER — BLOOD SUGAR DIAGNOSTIC
STRIP MISCELLANEOUS
COMMUNITY
Start: 2021-04-13

## 2021-05-04 RX ADMIN — MEPOLIZUMAB 100 MG: 100 INJECTION, POWDER, FOR SOLUTION SUBCUTANEOUS at 09:08

## 2021-05-04 NOTE — PROGRESS NOTES
Ed presents to [facility] for nucala injection. Ed has tolerated previous nucala injections without issue and denies having any significant changes since last appointment. Nucala injection given subcutaneously as ordered to left upper, back arm as ordered. Ed tolerated injection, band-aid applied to site. Next appointment scheduled, Ed discharged home to self care.

## 2021-06-01 ENCOUNTER — OUTPATIENT INFUSION SERVICES (OUTPATIENT)
Dept: ONCOLOGY | Facility: MEDICAL CENTER | Age: 66
End: 2021-06-01
Attending: ALLERGY & IMMUNOLOGY
Payer: MEDICARE

## 2021-06-01 VITALS
WEIGHT: 276.24 LBS | HEART RATE: 70 BPM | SYSTOLIC BLOOD PRESSURE: 158 MMHG | TEMPERATURE: 97.6 F | DIASTOLIC BLOOD PRESSURE: 60 MMHG | HEIGHT: 67 IN | OXYGEN SATURATION: 93 % | RESPIRATION RATE: 18 BRPM | BODY MASS INDEX: 43.36 KG/M2

## 2021-06-01 DIAGNOSIS — J45.50 SEVERE PERSISTENT ASTHMA WITHOUT COMPLICATION: ICD-10-CM

## 2021-06-01 PROCEDURE — 96372 THER/PROPH/DIAG INJ SC/IM: CPT

## 2021-06-01 PROCEDURE — 700111 HCHG RX REV CODE 636 W/ 250 OVERRIDE (IP): Mod: JG | Performed by: ALLERGY & IMMUNOLOGY

## 2021-06-01 RX ADMIN — MEPOLIZUMAB 100 MG: 100 INJECTION, POWDER, FOR SOLUTION SUBCUTANEOUS at 08:23

## 2021-06-29 ENCOUNTER — OUTPATIENT INFUSION SERVICES (OUTPATIENT)
Dept: ONCOLOGY | Facility: MEDICAL CENTER | Age: 66
End: 2021-06-29
Attending: ALLERGY & IMMUNOLOGY
Payer: MEDICARE

## 2021-06-29 VITALS
OXYGEN SATURATION: 93 % | TEMPERATURE: 97.2 F | RESPIRATION RATE: 18 BRPM | HEIGHT: 67 IN | DIASTOLIC BLOOD PRESSURE: 57 MMHG | BODY MASS INDEX: 43.46 KG/M2 | SYSTOLIC BLOOD PRESSURE: 172 MMHG | HEART RATE: 55 BPM | WEIGHT: 276.9 LBS

## 2021-06-29 DIAGNOSIS — J45.50 SEVERE PERSISTENT ASTHMA WITHOUT COMPLICATION: ICD-10-CM

## 2021-06-29 PROCEDURE — 700111 HCHG RX REV CODE 636 W/ 250 OVERRIDE (IP): Mod: JG | Performed by: ALLERGY & IMMUNOLOGY

## 2021-06-29 PROCEDURE — 96372 THER/PROPH/DIAG INJ SC/IM: CPT

## 2021-06-29 RX ADMIN — MEPOLIZUMAB 100 MG: 100 INJECTION, POWDER, FOR SOLUTION SUBCUTANEOUS at 08:32

## 2021-06-29 NOTE — PROGRESS NOTES
Pt arrives to IS for Nucala injection.  Pt denies s/sx of infection or worsening of asthma symptoms today.  Nucala given SC to back of RUE.  Pt declines Band-Aid. Scheduled pt for future appts.  New schedule given to pt.  Pt dc home to self care.

## 2021-06-30 ENCOUNTER — TELEPHONE (OUTPATIENT)
Dept: URGENT CARE | Facility: CLINIC | Age: 66
End: 2021-06-30

## 2021-06-30 ENCOUNTER — OFFICE VISIT (OUTPATIENT)
Dept: URGENT CARE | Facility: CLINIC | Age: 66
End: 2021-06-30
Payer: MEDICARE

## 2021-06-30 VITALS
RESPIRATION RATE: 18 BRPM | OXYGEN SATURATION: 94 % | TEMPERATURE: 97.7 F | BODY MASS INDEX: 42.91 KG/M2 | DIASTOLIC BLOOD PRESSURE: 76 MMHG | HEART RATE: 59 BPM | HEIGHT: 67 IN | WEIGHT: 273.4 LBS | SYSTOLIC BLOOD PRESSURE: 170 MMHG

## 2021-06-30 DIAGNOSIS — I10 ESSENTIAL HYPERTENSION: ICD-10-CM

## 2021-06-30 DIAGNOSIS — S39.012A STRAIN OF LUMBAR REGION, INITIAL ENCOUNTER: ICD-10-CM

## 2021-06-30 PROCEDURE — 99204 OFFICE O/P NEW MOD 45 MIN: CPT | Performed by: STUDENT IN AN ORGANIZED HEALTH CARE EDUCATION/TRAINING PROGRAM

## 2021-06-30 RX ORDER — METHOCARBAMOL 500 MG/1
TABLET, FILM COATED ORAL
Qty: 60 TABLET | Refills: 0 | Status: SHIPPED | OUTPATIENT
Start: 2021-06-30

## 2021-06-30 RX ORDER — DICLOFENAC SODIUM 75 MG/1
75 TABLET, DELAYED RELEASE ORAL 2 TIMES DAILY
Qty: 60 TABLET | Refills: 0 | Status: SHIPPED | OUTPATIENT
Start: 2021-06-30

## 2021-06-30 RX ORDER — LIDOCAINE 4 G/G
PATCH TOPICAL
Qty: 15 PATCH | Refills: 0 | Status: SHIPPED | OUTPATIENT
Start: 2021-06-30

## 2021-06-30 RX ORDER — MELOXICAM 15 MG/1
TABLET ORAL
Qty: 30 TABLET | Refills: 0 | Status: SHIPPED | OUTPATIENT
Start: 2021-06-30 | End: 2021-06-30

## 2021-06-30 RX ORDER — LANCETS
EACH MISCELLANEOUS
COMMUNITY
Start: 2021-06-10

## 2021-06-30 NOTE — PROGRESS NOTES
Low Subjective:   CHIEF COMPLAINT  Chief Complaint   Patient presents with   • Back Pain     lower back x 4 days; was swimming on saturday        [facility]  Ted Lambert is a 66 y.o. male who presents with a chief complaint of right-sided low back pain.  This has been somewhat of a chronic issue for the patient with an acute exacerbation which developed 3 days ago.  Says 6 days ago he was doing physical therapy including water exercises.  Then over the next couple days he developed localized right-sided low back pain.  He has tried Aleve and tramadol which have not helped.  Says the symptoms are worse with walking and weightbearing on his right leg.  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 There is no radiculopathy.  He is not experiencing any dysuria or hematuria.  No spontaneous loss of bowels or bladder.  No saddle anesthesia.  No abdominal pain.    REVIEW OF SYSTEMS  General: no fever or chills  GI: no nausea or vomiting  See HPI for further details.    PAST MEDICAL HISTORY   has a past medical history of Diabetes and Hypertension.    SURGICAL HISTORY  patient denies any surgical history    ALLERGIES  Allergies   Allergen Reactions   • Iodine Anaphylaxis       CURRENT MEDICATIONS  Home Medications     Reviewed by Tk Umaña Ass't (Medical Assistant) on 06/30/21 at 1003  Med List Status: <None>   Medication Last Dose Status   Accu-Chek FastClix Lancets Misc Taking Active   aspirin (ASA) 81 MG Chew Tab chewable tablet Taking Active   atorvastatin (LIPITOR) 40 MG Tab Taking Active   B-D UF III MINI PEN NEEDLES 31G X 5 MM Misc Taking Active   BREO ELLIPTA 100-25 MCG/INH AEROSOL POWDER, BREATH ACTIVATED Not Taking Active   BREO ELLIPTA 200-25 MCG/INH AEROSOL POWDER, BREATH ACTIVATED Taking Active   CABERGOLINE PO Taking Active   insulin lispro (HUMALOG) 100 UNIT/ML Solution Pen-injector injection PEN Taking Active   ipratropium-albuterol (COMBIVENT RESPIMAT)  " MCG/ACT Aero Soln Taking Active   levothyroxine (SYNTHROID) 150 MCG Tab Taking Active   losartan (COZAAR) 100 MG Tab Taking Active   metFORMIN ER (GLUCOPHAGE XR) 500 MG TABLET SR 24 HR Taking Active   Naproxen Sodium (ALEVE) 220 MG Cap PRN Active   NS SOLN 60 mL with albuterol 2.5 mg/0.5 mL NEBU 10 mL Not Taking Active   Omega-3 Fatty Acids (FISH OIL) 1000 MG Cap capsule Taking Active   omeprazole (PRILOSEC) 40 MG delayed-release capsule Taking Active   ONETOUCH VERIO strip Taking Active   SITagliptin (JANUVIA) 100 MG Tab Taking Active   TOPROL XL 50 MG PO TB24 Not Taking Active   TOUJEO SOLOSTAR 300 UNIT/ML Solution Pen-injector Taking Active   ULTRAM 50 MG PO TABS PRN Active   ZYRTEC 10 MG PO TABS PRN Active                SOCIAL HISTORY  Social History     Tobacco Use   • Smoking status: Former Smoker   • Smokeless tobacco: Never Used   Vaping Use   • Vaping Use: Never used   Substance and Sexual Activity   • Alcohol use: Not Currently     Comment: occ    • Drug use: Yes     Types: Marijuana, Oral     Comment: occ    • Sexual activity: Not on file       FAMILY HISTORY  No family history on file.       Objective:   PHYSICAL EXAM  VITAL SIGNS: BP (!) 170/76 (BP Location: Right arm, Patient Position: Sitting)   Pulse (!) 59   Temp 36.5 °C (97.7 °F) (Temporal)   Resp 18   Ht 1.69 m (5' 6.54\")   Wt 124 kg (273 lb 6.4 oz)   SpO2 94%   BMI 43.41 kg/m²     Gen: no acute distress, normal voice  Skin: dry, intact, moist mucosal membranes  Lungs: CTAB w/ symmetric expansion  CV: RRR w/o murmurs or clicks  MSK: Lumbar spine: No erythema, ecchymosis or edema.  Limited range of motion secondary to pain.  TTP along the right paraspinal muscles.  Distal sensation and motor fully intact.    Psych: normal affect, normal judgement, alert, awake    Assessment/Plan:     1. Strain of lumbar region, initial encounter  methocarbamol (ROBAXIN) 500 MG Tab    Lidocaine 4 % Patch    meloxicam (MOBIC) 15 MG tablet   2. " Essential hypertension     1)Exam ws reassuring.  No red flags.  Suspect he developed muscle spasms following physical therapy.  -Ordered Robaxin.  Side effects discussed.  Use sparingly  -Ordered Mobic.  Side effects discussed. Use sparingly.  Do not use with additional NSAIDs  -Ordered lidocaine patches  -Discussed weight loss   -Encouraged to continue physical therapy  -Discussed red flags and emergency room precautions    2) blood pressure elevated today at 170/76.  He managed with losartan which she took today.  I suspect the acute rise is due to pain, but did instruct the patient to follow-up with his PCP for reevaluation.  I did inform the patient that taking long-term NSAIDs can also worsen blood pressure and kidney function and to take Mobic only sparingly and as needed.    Differential diagnosis, natural history, supportive care, and indications for immediate follow-up discussed. All questions answered. Patient agrees with the plan of care.    Follow-up as needed if symptoms worsen or fail to improve to PCP, Urgent care or Emergency Room.    Please note that this dictation was created using voice recognition software. I have made a reasonable attempt to correct obvious errors, but I expect that there are errors of grammar and possibly content that I did not discover before finalizing the note.

## 2021-07-01 NOTE — TELEPHONE ENCOUNTER
Patient called stating insurance would not cover Mobic.  Will send in alternative prescription for diclofenac.

## 2021-07-27 ENCOUNTER — OUTPATIENT INFUSION SERVICES (OUTPATIENT)
Dept: ONCOLOGY | Facility: MEDICAL CENTER | Age: 66
End: 2021-07-27
Attending: ALLERGY & IMMUNOLOGY
Payer: MEDICARE

## 2021-07-27 VITALS
HEIGHT: 67 IN | WEIGHT: 273.37 LBS | TEMPERATURE: 97.5 F | DIASTOLIC BLOOD PRESSURE: 54 MMHG | BODY MASS INDEX: 42.91 KG/M2 | HEART RATE: 67 BPM | OXYGEN SATURATION: 98 % | RESPIRATION RATE: 18 BRPM | SYSTOLIC BLOOD PRESSURE: 158 MMHG

## 2021-07-27 DIAGNOSIS — J45.50 SEVERE PERSISTENT ASTHMA WITHOUT COMPLICATION: ICD-10-CM

## 2021-07-27 PROCEDURE — 700111 HCHG RX REV CODE 636 W/ 250 OVERRIDE (IP): Mod: JG | Performed by: ALLERGY & IMMUNOLOGY

## 2021-07-27 PROCEDURE — 96372 THER/PROPH/DIAG INJ SC/IM: CPT

## 2021-07-27 RX ADMIN — MEPOLIZUMAB 100 MG: 100 INJECTION, POWDER, FOR SOLUTION SUBCUTANEOUS at 08:20

## 2021-08-24 ENCOUNTER — OUTPATIENT INFUSION SERVICES (OUTPATIENT)
Dept: ONCOLOGY | Facility: MEDICAL CENTER | Age: 66
End: 2021-08-24
Attending: ALLERGY & IMMUNOLOGY
Payer: MEDICARE

## 2021-08-24 VITALS
WEIGHT: 269.4 LBS | BODY MASS INDEX: 42.28 KG/M2 | OXYGEN SATURATION: 98 % | HEART RATE: 64 BPM | DIASTOLIC BLOOD PRESSURE: 52 MMHG | TEMPERATURE: 98 F | SYSTOLIC BLOOD PRESSURE: 142 MMHG | HEIGHT: 67 IN | RESPIRATION RATE: 18 BRPM

## 2021-08-24 DIAGNOSIS — J45.50 SEVERE PERSISTENT ASTHMA WITHOUT COMPLICATION: ICD-10-CM

## 2021-08-24 PROCEDURE — 700111 HCHG RX REV CODE 636 W/ 250 OVERRIDE (IP): Mod: JG | Performed by: ALLERGY & IMMUNOLOGY

## 2021-08-24 PROCEDURE — 96372 THER/PROPH/DIAG INJ SC/IM: CPT

## 2021-08-24 RX ORDER — AMLODIPINE BESYLATE 2.5 MG/1
2.5 TABLET ORAL
COMMUNITY
Start: 2021-07-14

## 2021-08-24 RX ORDER — MELOXICAM 15 MG/1
TABLET ORAL
COMMUNITY
Start: 2021-06-30

## 2021-08-24 RX ADMIN — MEPOLIZUMAB 100 MG: 100 INJECTION, POWDER, FOR SOLUTION SUBCUTANEOUS at 08:31

## 2021-08-24 NOTE — PROGRESS NOTES
Ed arrives ambulatory to IS for Nucala injections.  Ed voices no complaints, denies s/s active infection currently.  Nucala administered per MAR to back right arm.  Discharged in no apparent distress, next appointment confirmed.    
[facility]

## 2021-09-21 ENCOUNTER — OUTPATIENT INFUSION SERVICES (OUTPATIENT)
Dept: ONCOLOGY | Facility: MEDICAL CENTER | Age: 66
End: 2021-09-21
Attending: ALLERGY & IMMUNOLOGY
Payer: MEDICARE

## 2021-09-21 VITALS
TEMPERATURE: 97.3 F | SYSTOLIC BLOOD PRESSURE: 153 MMHG | OXYGEN SATURATION: 96 % | DIASTOLIC BLOOD PRESSURE: 52 MMHG | WEIGHT: 271.83 LBS | HEART RATE: 70 BPM | HEIGHT: 67 IN | RESPIRATION RATE: 18 BRPM | BODY MASS INDEX: 42.66 KG/M2

## 2021-09-21 DIAGNOSIS — J45.50 SEVERE PERSISTENT ASTHMA WITHOUT COMPLICATION: ICD-10-CM

## 2021-09-21 PROCEDURE — 700111 HCHG RX REV CODE 636 W/ 250 OVERRIDE (IP): Mod: JG | Performed by: ALLERGY & IMMUNOLOGY

## 2021-09-21 PROCEDURE — 96372 THER/PROPH/DIAG INJ SC/IM: CPT

## 2021-09-21 RX ORDER — INSULIN LISPRO 200 [IU]/ML
3 INJECTION, SOLUTION SUBCUTANEOUS
COMMUNITY
Start: 2021-09-17

## 2021-09-21 RX ORDER — INSULIN GLARGINE 300 U/ML
3 INJECTION, SOLUTION SUBCUTANEOUS
COMMUNITY
Start: 2021-09-15

## 2021-09-21 RX ADMIN — MEPOLIZUMAB 100 MG: 100 INJECTION, POWDER, FOR SOLUTION SUBCUTANEOUS at 08:59

## 2021-09-21 NOTE — PROGRESS NOTES
Patient arrived to [facility] alert, oriented x4, ambulatory for his q4week Nucala injection. He voices no new concerns nor active infections. VSS, afebrile. He states he has not had an asthma flare since the start of Nucala. Nucala administered SQ to back of left arm. Confirmed next appt for 10/19. Discharged home to self care in no apparent distress.

## 2021-10-19 ENCOUNTER — OUTPATIENT INFUSION SERVICES (OUTPATIENT)
Dept: ONCOLOGY | Facility: MEDICAL CENTER | Age: 66
End: 2021-10-19
Attending: ALLERGY & IMMUNOLOGY
Payer: MEDICARE

## 2021-10-19 VITALS
DIASTOLIC BLOOD PRESSURE: 51 MMHG | BODY MASS INDEX: 43.32 KG/M2 | RESPIRATION RATE: 18 BRPM | SYSTOLIC BLOOD PRESSURE: 150 MMHG | OXYGEN SATURATION: 95 % | HEIGHT: 67 IN | HEART RATE: 58 BPM | WEIGHT: 276.02 LBS | TEMPERATURE: 97.4 F

## 2021-10-19 DIAGNOSIS — J45.50 SEVERE PERSISTENT ASTHMA WITHOUT COMPLICATION: ICD-10-CM

## 2021-10-19 PROCEDURE — 700111 HCHG RX REV CODE 636 W/ 250 OVERRIDE (IP): Mod: JG | Performed by: ALLERGY & IMMUNOLOGY

## 2021-10-19 PROCEDURE — 96372 THER/PROPH/DIAG INJ SC/IM: CPT

## 2021-10-19 RX ADMIN — MEPOLIZUMAB 100 MG: 100 INJECTION, POWDER, FOR SOLUTION SUBCUTANEOUS at 08:19

## 2021-10-19 NOTE — PROGRESS NOTES
Pt returns for q 4 week Nucala for asthma. Pt reports he has had a dramatic benefit in his allergy symptoms with this medication. Injection given to back of R arm, no band aide applied. Printout of future appts provided, pt discharged home under self care in no apparent distress.

## 2021-11-16 ENCOUNTER — OUTPATIENT INFUSION SERVICES (OUTPATIENT)
Dept: ONCOLOGY | Facility: MEDICAL CENTER | Age: 66
End: 2021-11-16
Attending: ALLERGY & IMMUNOLOGY
Payer: MEDICARE

## 2021-11-16 VITALS
DIASTOLIC BLOOD PRESSURE: 54 MMHG | SYSTOLIC BLOOD PRESSURE: 150 MMHG | TEMPERATURE: 98 F | WEIGHT: 279.98 LBS | BODY MASS INDEX: 43.94 KG/M2 | RESPIRATION RATE: 18 BRPM | HEIGHT: 67 IN | HEART RATE: 67 BPM

## 2021-11-16 DIAGNOSIS — J45.50 SEVERE PERSISTENT ASTHMA WITHOUT COMPLICATION: ICD-10-CM

## 2021-11-16 PROCEDURE — 700111 HCHG RX REV CODE 636 W/ 250 OVERRIDE (IP): Mod: JG | Performed by: ALLERGY & IMMUNOLOGY

## 2021-11-16 PROCEDURE — 96372 THER/PROPH/DIAG INJ SC/IM: CPT

## 2021-11-16 RX ADMIN — MEPOLIZUMAB 100 MG: 100 INJECTION, POWDER, FOR SOLUTION SUBCUTANEOUS at 09:52

## 2021-11-16 NOTE — PROGRESS NOTES
Pt ambulatory to [facility] for Nucala injection for asthma.  Pt w/ no s/s of infection, pt w/ no complaints at this time.  Pt states his asthma improving w/ injection.  Nucala injected into left back arm, no band-aid placed.  Pt left on foot in NAD.  Confirmed pt's next appt.

## 2021-12-14 ENCOUNTER — APPOINTMENT (OUTPATIENT)
Dept: ONCOLOGY | Facility: MEDICAL CENTER | Age: 66
End: 2021-12-14
Attending: ALLERGY & IMMUNOLOGY
Payer: MEDICARE

## 2022-01-12 ENCOUNTER — APPOINTMENT (OUTPATIENT)
Dept: ONCOLOGY | Facility: MEDICAL CENTER | Age: 67
End: 2022-01-12
Attending: ALLERGY & IMMUNOLOGY
Payer: MEDICARE

## 2022-11-08 ENCOUNTER — PATIENT MESSAGE (OUTPATIENT)
Dept: HEALTH INFORMATION MANAGEMENT | Facility: OTHER | Age: 67
End: 2022-11-08